# Patient Record
Sex: MALE | Race: ASIAN | NOT HISPANIC OR LATINO | URBAN - METROPOLITAN AREA
[De-identification: names, ages, dates, MRNs, and addresses within clinical notes are randomized per-mention and may not be internally consistent; named-entity substitution may affect disease eponyms.]

---

## 2019-08-03 ENCOUNTER — EMERGENCY (EMERGENCY)
Facility: HOSPITAL | Age: 24
LOS: 1 days | Discharge: HOME | End: 2019-08-03
Payer: MEDICAID

## 2019-08-03 VITALS
OXYGEN SATURATION: 99 % | SYSTOLIC BLOOD PRESSURE: 133 MMHG | RESPIRATION RATE: 20 BRPM | HEART RATE: 103 BPM | DIASTOLIC BLOOD PRESSURE: 79 MMHG | WEIGHT: 163.14 LBS | TEMPERATURE: 98 F | HEIGHT: 70 IN

## 2019-08-03 PROCEDURE — 99283 EMERGENCY DEPT VISIT LOW MDM: CPT

## 2019-08-03 PROCEDURE — 99282 EMERGENCY DEPT VISIT SF MDM: CPT

## 2019-08-04 VITALS
SYSTOLIC BLOOD PRESSURE: 120 MMHG | OXYGEN SATURATION: 98 % | TEMPERATURE: 98 F | RESPIRATION RATE: 16 BRPM | HEART RATE: 87 BPM | DIASTOLIC BLOOD PRESSURE: 84 MMHG

## 2019-08-04 NOTE — ED ADULT NURSE NOTE - OBJECTIVE STATEMENT
22 y/o male who bib friend for being stressed out x 4 days, depressed mood, low energy and poor concentration and w/ racing thoughts. pt. denies any s/hi, no a/v hallucinations or delusions of any kind. pt. not on any current psych tx and no meds for depression or psychosis. pt. denies any etoh/drug abuse hx.

## 2019-08-04 NOTE — ED PROVIDER NOTE - NSFOLLOWUPCLINICS_GEN_ALL_ED_FT
[facility]  Psychiatry  [id number] [place] [address]  Phone: [phone]  Fax:   Follow Up Time: 1-3 Days

## 2019-08-04 NOTE — ED PROVIDER NOTE - NSFOLLOWUPINSTRUCTIONS_ED_ALL_ED_FT
You may follow up at the [facility] crisis center for further evaluation of this condition.    You may call, or walk-in to get seen by an outpatient psychiatrist.    If you feel worsening anxiety, depression, or any thoughts of self-harm, please return to the ED immediately.

## 2019-08-04 NOTE — ED PROVIDER NOTE - CLINICAL SUMMARY MEDICAL DECISION MAKING FREE TEXT BOX
Impression:  anxiety during a time of significant social stress.  No SI/HI, no Hx A/V hallucinations.  Patient's exam is normal.  Collateral does not suggest that patient is at high risk for self harm.    Patient does not need Telepsych eval at this time.    Plan:  d/c home, f/u [facility] crisis center, strict return precautions.

## 2019-08-04 NOTE — ED PROVIDER NOTE - PROGRESS NOTE DETAILS
Case d/w friend who dropped him off at the ED (Hurpreet [phone]).  He never felt like Suresh was in any imminent threat of life.  No Suspicion for SI/HI.  he just wanted him to get evaluated by a doctor. Impression:  anxiety during a time of significant social stress.  No SI/HI, no Hx A/V hallucinations.  Patient's exam is normal.  Collateral does not suggest that patient is at high risk for self harm.    Plan:  d/c home, f/u [facility] crisis center, strict return precautions.

## 2019-08-04 NOTE — ED PROVIDER NOTE - OBJECTIVE STATEMENT
22 yo M, no prior psych Hx bib friend, "to get checked out."  Context:  patient has been under significant social stress: wife and him have .  Over the course of their relationship he has become estranged from his family and all his friends.  "I've lost my social [place]."  Now that he and his wife are , he feels like he has no friends.  Associated Sx: no SI/HI.  No A/v hallucinations.  He feels like his only friend is his landlord's son, Fortino, who brought him to the ED, so that he could talk to someone about his problems. 22 yo M, no prior psych Hx bib friend, "to get checked out."  Context:  patient has been under significant social stress: wife and him have .  Over the course of their relationship he has become estranged from his family and all his friends.  "I've lost my social [place]."  Now that he and his wife are , he feels like he has no friends.  Associated Sx: no SI/HI.  No A/v hallucinations.  He feels like his only friend is his landlord's son, Fortino, who brought him to the ED, so that he could talk to someone about his problems.  He also states that he is an only child and would never put his parents through the grief of losing their only son.

## 2021-03-24 NOTE — ED ADULT NURSE NOTE - REASON FOR REFERRAL
- h/o  DM, on metformin 500 qd at home  - a1c 7.3   - c/w lantus 12u qHs and HSS  -pt is to be dced on metformin 500 BID bib friend for psych eval
